# Patient Record
Sex: MALE | Race: ASIAN | NOT HISPANIC OR LATINO
[De-identification: names, ages, dates, MRNs, and addresses within clinical notes are randomized per-mention and may not be internally consistent; named-entity substitution may affect disease eponyms.]

---

## 2019-07-01 ENCOUNTER — APPOINTMENT (OUTPATIENT)
Dept: CARDIOLOGY | Facility: CLINIC | Age: 69
End: 2019-07-01
Payer: MEDICAID

## 2019-07-01 ENCOUNTER — NON-APPOINTMENT (OUTPATIENT)
Age: 69
End: 2019-07-01

## 2019-07-01 VITALS
OXYGEN SATURATION: 98 % | TEMPERATURE: 97.6 F | BODY MASS INDEX: 21.67 KG/M2 | RESPIRATION RATE: 16 BRPM | DIASTOLIC BLOOD PRESSURE: 77 MMHG | SYSTOLIC BLOOD PRESSURE: 121 MMHG | HEART RATE: 48 BPM | HEIGHT: 68 IN | WEIGHT: 143 LBS

## 2019-07-01 DIAGNOSIS — R94.31 ABNORMAL ELECTROCARDIOGRAM [ECG] [EKG]: ICD-10-CM

## 2019-07-01 PROCEDURE — 99214 OFFICE O/P EST MOD 30 MIN: CPT

## 2019-07-01 NOTE — PHYSICAL EXAM
[General Appearance - Well Developed] : well developed [Normal Appearance] : normal appearance [Well Groomed] : well groomed [General Appearance - Well Nourished] : well nourished [No Deformities] : no deformities [General Appearance - In No Acute Distress] : no acute distress [Normal Conjunctiva] : the conjunctiva exhibited no abnormalities [Eyelids - No Xanthelasma] : the eyelids demonstrated no xanthelasmas [Normal Oral Mucosa] : normal oral mucosa [No Oral Pallor] : no oral pallor [No Oral Cyanosis] : no oral cyanosis [Normal Jugular Venous A Waves Present] : normal jugular venous A waves present [Normal Jugular Venous V Waves Present] : normal jugular venous V waves present [No Jugular Venous Vasquez A Waves] : no jugular venous vasquez A waves [Respiration, Rhythm And Depth] : normal respiratory rhythm and effort [Exaggerated Use Of Accessory Muscles For Inspiration] : no accessory muscle use [Auscultation Breath Sounds / Voice Sounds] : lungs were clear to auscultation bilaterally [Heart Rate And Rhythm] : heart rate and rhythm were normal [Heart Sounds] : normal S1 and S2 [Murmurs] : no murmurs present [Arterial Pulses Normal] : the arterial pulses were normal [Edema] : no peripheral edema present [Abdomen Soft] : soft [Abdomen Tenderness] : non-tender [Abdomen Mass (___ Cm)] : no abdominal mass palpated [Abnormal Walk] : normal gait [Gait - Sufficient For Exercise Testing] : the gait was sufficient for exercise testing [Nail Clubbing] : no clubbing of the fingernails [Cyanosis, Localized] : no localized cyanosis [Petechial Hemorrhages (___cm)] : no petechial hemorrhages [] : no ischemic changes [Oriented To Time, Place, And Person] : oriented to person, place, and time [Affect] : the affect was normal [Mood] : the mood was normal [No Anxiety] : not feeling anxious

## 2019-07-01 NOTE — DISCUSSION/SUMMARY
[FreeTextEntry1] : 65-year-old male with bronchiectasis and hepatitis B presents for followup.  Patient was last seen on 3/6/15 for evaluation of bradycardia and palpitations.  Patient had a normal stress echo.  Patient has been stable.  He was noted to have HR of 42 on routine ECG with PCP.  He denies CP or SOB with exertion.  He denies lightheadedness or syncope.  He reports rare palpitations at night.\par \par (1) Sinus bradycardia at 42 - Patient is asymptomatic.  Patient underwent a treadmill stress test and completed 9.5 minutes of Gil protocol.  There was no ECG evidence of ischemia.  His HR ar appropriately with treadmill stress.  His sinus bradycardia is likely physiologic.  Patient was reassured.\par \par (2) Followup - as needed.

## 2019-07-01 NOTE — HISTORY OF PRESENT ILLNESS
[FreeTextEntry1] : Patient feels well. His HR is slow at 48. Patient denies CP, SOB, palpitations. He reports one episode of room spinning 2,3 months ago. He exercises daily. I advised patient to undergo an echocardiogram and a treadmill stress test. I advised patient to wear a Holter monitor. Patient will return for tests. \par \par 65-year-old male with bronchiectasis and hepatitis B presents for followup.  Patient was last seen on 4/12/16.  Patient underwent a treadmill stress test and completed 9.5 minutes of Gil protocol.  There was no ECG evidence of ischemia.  His HR ar appropriately with treadmill stress.  His sinus bradycardia was felt to be physiologic.\par \par (1) Sinus bradycardia at 42 - Patient is asymptomatic.  Patient underwent a treadmill stress test and completed 9.5 minutes of Gil protocol.  There was no ECG evidence of ischemia.  His HR ar appropriately with treadmill stress.  His sinus bradycardia is likely physiologic.  Patient was reassured.\par \par (2) Followup - as needed.\par \par 3/6/15 for evaluation of bradycardia and palpitations.  Patient had a normal stress echo.  Patient has been stable.  He was noted to have HR of 42 on routine ECG with PCP.  He denies CP or SOB with exertion.  He denies lightheadedness or syncope.  He reports rare palpitations at night.

## 2022-02-10 PROBLEM — R00.1 BRADYCARDIA: Status: ACTIVE | Noted: 2019-07-01

## 2022-02-10 NOTE — REASON FOR VISIT
[FreeTextEntry1] : 71-year-old male with bronchiectasis and hepatitis B presents for followup.  \par \par Patient was last seen on 7/1/19 for bradycardia I advised patient to undergo an echocardiogram and a treadmill stress test. I advised patient to wear a Holter monitor.  Patient wished to return for tests. \par \par Patient underwent a treadmill stress test 4/12/16 and completed 9.5 minutes of Gil protocol.  There was no ECG evidence of ischemia.  His HR ar appropriately with treadmill stress.  His sinus bradycardia was felt to be physiologic.\par

## 2022-02-10 NOTE — HISTORY OF PRESENT ILLNESS
[FreeTextEntry1] : 7/1/19 - Patient feels well. His HR is slow at 48. Patient denies CP, SOB, palpitations. He reports one episode of room spinning 2,3 months ago. He exercises daily. I advised patient to undergo an echocardiogram and a treadmill stress test. I advised patient to wear a Holter monitor. Patient will return for tests. \par \par 4/12/16 - Patient has been stable. He was noted to have HR of 42 on routine ECG with PCP. He denies CP or SOB with exertion. He denies lightheadedness or syncope. He reports rare palpitations at night. \par \par 3/6/15 - Patient reports that he has been experiencing palpitations at night, described as fast heartbeats, lasting seconds. He wore a Holter monitor with Dr. Christian and it showed occasional APC's and one APC couplet. He denies chest pain or shortness of breath with exertion. He recalls an episode of syncope in his 30s when he went to use the bathroom. He reports a history of bradycardia. \par \par \par

## 2022-02-11 ENCOUNTER — APPOINTMENT (OUTPATIENT)
Dept: CARDIOLOGY | Facility: CLINIC | Age: 72
End: 2022-02-11
Payer: MEDICARE

## 2022-02-11 ENCOUNTER — NON-APPOINTMENT (OUTPATIENT)
Age: 72
End: 2022-02-11

## 2022-02-11 VITALS
DIASTOLIC BLOOD PRESSURE: 74 MMHG | HEART RATE: 75 BPM | OXYGEN SATURATION: 98 % | BODY MASS INDEX: 23.57 KG/M2 | WEIGHT: 155 LBS | SYSTOLIC BLOOD PRESSURE: 135 MMHG | TEMPERATURE: 97.7 F | RESPIRATION RATE: 18 BRPM

## 2022-02-11 DIAGNOSIS — R00.1 BRADYCARDIA, UNSPECIFIED: ICD-10-CM

## 2022-02-11 PROCEDURE — 99214 OFFICE O/P EST MOD 30 MIN: CPT | Mod: 25

## 2022-02-11 PROCEDURE — 93015 CV STRESS TEST SUPVJ I&R: CPT

## 2022-02-11 PROCEDURE — 93306 TTE W/DOPPLER COMPLETE: CPT

## 2022-02-11 PROCEDURE — 93000 ELECTROCARDIOGRAM COMPLETE: CPT | Mod: 59

## 2022-02-22 ENCOUNTER — FORM ENCOUNTER (OUTPATIENT)
Age: 72
End: 2022-02-22

## 2022-03-02 ENCOUNTER — NON-APPOINTMENT (OUTPATIENT)
Age: 72
End: 2022-03-02

## 2023-12-06 ENCOUNTER — NON-APPOINTMENT (OUTPATIENT)
Age: 73
End: 2023-12-06

## 2023-12-06 ENCOUNTER — APPOINTMENT (OUTPATIENT)
Dept: CARDIOLOGY | Facility: CLINIC | Age: 73
End: 2023-12-06
Payer: MEDICARE

## 2023-12-06 VITALS
SYSTOLIC BLOOD PRESSURE: 123 MMHG | WEIGHT: 154 LBS | BODY MASS INDEX: 23.42 KG/M2 | DIASTOLIC BLOOD PRESSURE: 74 MMHG | OXYGEN SATURATION: 95 % | TEMPERATURE: 96.8 F | HEART RATE: 65 BPM | RESPIRATION RATE: 16 BRPM

## 2023-12-06 DIAGNOSIS — R00.2 PALPITATIONS: ICD-10-CM

## 2023-12-06 DIAGNOSIS — R55 SYNCOPE AND COLLAPSE: ICD-10-CM

## 2023-12-06 DIAGNOSIS — Z01.810 ENCOUNTER FOR PREPROCEDURAL CARDIOVASCULAR EXAMINATION: ICD-10-CM

## 2023-12-06 PROCEDURE — 99213 OFFICE O/P EST LOW 20 MIN: CPT | Mod: 25

## 2023-12-06 PROCEDURE — 93000 ELECTROCARDIOGRAM COMPLETE: CPT | Mod: NC
